# Patient Record
(demographics unavailable — no encounter records)

---

## 2024-11-11 NOTE — HISTORY OF PRESENT ILLNESS
[Depression] : depression [FreeTextEntry1] : 11/11/2024:  Continues to feel fine.  Recent MRI abdomen by GI was reportedly unremarkable.  No current complaints. 8/12/2024:  Feeling fine since last visit.  Dyspnea/wheezing  have resolved since her pulmonologist prescribed a course of prednisone several months ago.  LBP remains resolved.   No GI symptoms.  Intermittent numbness in her left heel has also virtually resolved.   No current complaints. 5/6/2024:  Still w/ dyspnea / wheezing - she saw pulm who prescribed an inhaler, on which the wheezing improves, though she is still unsure of the diagnosis.  Otherwise, feeling fine overall since last visit.  LBP remains resolved.   No GI symptoms.  Intermittent numbness in her left heel has also virtually resolved.    1/22/2024:  Pt reports that about 1 month ago, she began to experience dyspnea and a cough - she saw pulm, who start her on prednisone and she is now feeling better, though she is unsure of the diagnosis.  Otherwise, feeling fine overall since last visit.  LBP remains resolved.   No GI symptoms.  Still w/ intermittent numbness in her left heel x 1 year. [Anorexia] : no anorexia [Weight Loss] : no weight loss [Malaise] : no malaise [Fever] : no fever [Chills] : no chills [Fatigue] : no fatigue [Malar Facial Rash] : no malar facial rash [Skin Lesions] : no lesions [Skin Nodules] : no skin nodules [Oral Ulcers] : no oral ulcers [Cough] : no cough [Dry Mouth] : no dry mouth [Dysphonia] : no dysphonia [Dysphagia] : no dysphagia [Shortness of Breath] : no shortness of breath [Chest Pain] : no chest pain [Arthralgias] : no arthralgias [Joint Swelling] : no joint swelling [Joint Warmth] : no joint warmth [Joint Deformity] : no joint deformity [Decreased ROM] : no decreased range of motion [Morning Stiffness] : no morning stiffness [Falls] : no falls [Difficulty Standing] : no difficulty standing [Difficulty Walking] : no difficulty walking [Dyspnea] : no dyspnea [Myalgias] : no myalgias [Muscle Weakness] : no muscle weakness [Muscle Spasms] : no muscle spasms [Muscle Cramping] : no muscle cramping [Visual Changes] : no visual changes [Eye Pain] : no eye pain [Eye Redness] : no eye redness [Dry Eyes] : no dry eyes

## 2024-11-11 NOTE — ASSESSMENT
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 Recent LBP radiating down her LLE has resolved. Recent numbness in her left heel has also resolved,  Recent wheezing was diagnosed as reactive airway disease, and has resolved s/p a course of prednisone.  - Increase Cellcept to 1000mg BID..  - Flu vaccine (fall 2024, per pt), Prevnar (11/21), Pneumovax (11/22) UTD. Pt has received the COVID19 vaccine + booster x 2 + bivalent booster.  - Repeat labs prior to next visit.  - GI f/u  - Pulm f/u re: RAD

## 2024-11-11 NOTE — PHYSICAL EXAM
[General Appearance - Alert] : alert [General Appearance - In No Acute Distress] : in no acute distress [Sclera] : the sclera and conjunctiva were normal [Outer Ear] : the ears and nose were normal in appearance [Oropharynx] : the oropharynx was normal [Neck Appearance] : the appearance of the neck was normal [Neck Cervical Mass (___cm)] : no neck mass was observed [Jugular Venous Distention Increased] : there was no jugular-venous distention [Thyroid Diffuse Enlargement] : the thyroid was not enlarged [Thyroid Nodule] : there were no palpable thyroid nodules [Auscultation Breath Sounds / Voice Sounds] : lungs were clear to auscultation bilaterally [Heart Rate And Rhythm] : heart rate was normal and rhythm regular [Heart Sounds] : normal S1 and S2 [Heart Sounds Gallop] : no gallops [Murmurs] : no murmurs [Heart Sounds Pericardial Friction Rub] : no pericardial rub [Edema] : there was no peripheral edema [Bowel Sounds] : normal bowel sounds [Abdomen Soft] : soft [Abdomen Tenderness] : non-tender [Abdomen Mass (___ Cm)] : no abdominal mass palpated [Cervical Lymph Nodes Enlarged Posterior Bilaterally] : posterior cervical [Cervical Lymph Nodes Enlarged Anterior Bilaterally] : anterior cervical [Supraclavicular Lymph Nodes Enlarged Bilaterally] : supraclavicular [No Spinal Tenderness] : no spinal tenderness [Skin Color & Pigmentation] : normal skin color and pigmentation [Skin Turgor] : normal skin turgor [] : no rash [No Focal Deficits] : no focal deficits [Oriented To Time, Place, And Person] : oriented to person, place, and time [Impaired Insight] : insight and judgment were intact [Affect] : the affect was normal [FreeTextEntry1] : No synovitis, full ROM in all joints\par

## 2025-02-24 NOTE — ASSESSMENT
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 Recent LBP radiating down her LLE has resolved. Recent numbness in her left heel has also resolved,  Recent wheezing was diagnosed as reactive airway disease, and has resolved s/p a course of prednisone.  - Cont Cellcept 1000mg BID..  - Flu vaccine (fall 2024, per pt), Prevnar (11/21), Pneumovax (11/22) UTD. Pt has received the COVID19 vaccine + booster x 2 + bivalent booster.  - Repeat labs prior to next visit.  - GI f/u  - Pulm f/u re: RAD

## 2025-02-24 NOTE — HISTORY OF PRESENT ILLNESS
[Depression] : depression [FreeTextEntry1] : 2/24/2025:  Still feeling fine.  No GI symptoms.  No current complaints. 11/11/2024:  Continues to feel fine.  Recent MRI abdomen by GI was reportedly unremarkable.  No current complaints. 8/12/2024:  Feeling fine since last visit.  Dyspnea/wheezing  have resolved since her pulmonologist prescribed a course of prednisone several months ago.  LBP remains resolved.   No GI symptoms.  Intermittent numbness in her left heel has also virtually resolved.   No current complaints. 5/6/2024:  Still w/ dyspnea / wheezing - she saw pulm who prescribed an inhaler, on which the wheezing improves, though she is still unsure of the diagnosis.  Otherwise, feeling fine overall since last visit.  LBP remains resolved.   No GI symptoms.  Intermittent numbness in her left heel has also virtually resolved.    1/22/2024:  Pt reports that about 1 month ago, she began to experience dyspnea and a cough - she saw pulm, who start her on prednisone and she is now feeling better, though she is unsure of the diagnosis.  Otherwise, feeling fine overall since last visit.  LBP remains resolved.   No GI symptoms.  Still w/ intermittent numbness in her left heel x 1 year. [Anorexia] : no anorexia [Weight Loss] : no weight loss [Malaise] : no malaise [Fever] : no fever [Chills] : no chills [Fatigue] : no fatigue [Malar Facial Rash] : no malar facial rash [Skin Lesions] : no lesions [Skin Nodules] : no skin nodules [Oral Ulcers] : no oral ulcers [Cough] : no cough [Dry Mouth] : no dry mouth [Dysphonia] : no dysphonia [Dysphagia] : no dysphagia [Shortness of Breath] : no shortness of breath [Chest Pain] : no chest pain [Arthralgias] : no arthralgias [Joint Swelling] : no joint swelling [Joint Warmth] : no joint warmth [Joint Deformity] : no joint deformity [Decreased ROM] : no decreased range of motion [Morning Stiffness] : no morning stiffness [Falls] : no falls [Difficulty Standing] : no difficulty standing [Difficulty Walking] : no difficulty walking [Dyspnea] : no dyspnea [Myalgias] : no myalgias [Muscle Weakness] : no muscle weakness [Muscle Spasms] : no muscle spasms [Muscle Cramping] : no muscle cramping [Visual Changes] : no visual changes [Eye Pain] : no eye pain [Eye Redness] : no eye redness [Dry Eyes] : no dry eyes

## 2025-07-07 NOTE — HISTORY OF PRESENT ILLNESS
[Depression] : depression [FreeTextEntry1] : 7/7/2025:  Continues to feel fine overall.  She c/o mild pain/stiffness in her hands upon getting up in the mornings.  Also w/ persistent fatigue - w/u by her PMD revealed mild anemia, though Hgb was then normal again upon repeat.  No GI symptoms.   2/24/2025:  Still feeling fine.  No GI symptoms.  No current complaints. 11/11/2024:  Continues to feel fine.  Recent MRI abdomen by GI was reportedly unremarkable.  No current complaints. 8/12/2024:  Feeling fine since last visit.  Dyspnea/wheezing  have resolved since her pulmonologist prescribed a course of prednisone several months ago.  LBP remains resolved.   No GI symptoms.  Intermittent numbness in her left heel has also virtually resolved.   No current complaints. 5/6/2024:  Still w/ dyspnea / wheezing - she saw pulm who prescribed an inhaler, on which the wheezing improves, though she is still unsure of the diagnosis.  Otherwise, feeling fine overall since last visit.  LBP remains resolved.   No GI symptoms.  Intermittent numbness in her left heel has also virtually resolved.    1/22/2024:  Pt reports that about 1 month ago, she began to experience dyspnea and a cough - she saw pulm, who start her on prednisone and she is now feeling better, though she is unsure of the diagnosis.  Otherwise, feeling fine overall since last visit.  LBP remains resolved.   No GI symptoms.  Still w/ intermittent numbness in her left heel x 1 year. [Anorexia] : no anorexia [Weight Loss] : no weight loss [Malaise] : no malaise [Fever] : no fever [Chills] : no chills [Fatigue] : no fatigue [Malar Facial Rash] : no malar facial rash [Skin Lesions] : no lesions [Skin Nodules] : no skin nodules [Oral Ulcers] : no oral ulcers [Cough] : no cough [Dry Mouth] : no dry mouth [Dysphonia] : no dysphonia [Dysphagia] : no dysphagia [Shortness of Breath] : no shortness of breath [Chest Pain] : no chest pain [Arthralgias] : no arthralgias [Joint Swelling] : no joint swelling [Joint Warmth] : no joint warmth [Joint Deformity] : no joint deformity [Decreased ROM] : no decreased range of motion [Morning Stiffness] : no morning stiffness [Falls] : no falls [Difficulty Standing] : no difficulty standing [Difficulty Walking] : no difficulty walking [Dyspnea] : no dyspnea [Myalgias] : no myalgias [Muscle Weakness] : no muscle weakness [Muscle Spasms] : no muscle spasms [Muscle Cramping] : no muscle cramping [Visual Changes] : no visual changes [Eye Pain] : no eye pain [Eye Redness] : no eye redness [Dry Eyes] : no dry eyes

## 2025-07-07 NOTE — ASSESSMENT
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 Recent LBP radiating down her LLE has resolved. Recent numbness in her left heel has also resolved,  Recent wheezing was diagnosed as reactive airway disease, and has resolved s/p a course of prednisone.  She currently c/o fatigue - w/u revealed mild anemia, though it was normal upon repeat.  Also w/ recent mildly elevated alk phos.  - Cont Cellcept 1000mg BID..  - Flu vaccine (fall 2024, per pt), Prevnar (11/21), Pneumovax (11/22) UTD. Pt has received the COVID19 vaccine + booster x 2 + bivalent booster.  - Check labs, including repeat CBC, alk phos, iron studies.  - GI f/u  - Pulm f/u re: RAD